# Patient Record
Sex: FEMALE | NOT HISPANIC OR LATINO | Employment: OTHER | URBAN - METROPOLITAN AREA
[De-identification: names, ages, dates, MRNs, and addresses within clinical notes are randomized per-mention and may not be internally consistent; named-entity substitution may affect disease eponyms.]

---

## 2018-08-23 ENCOUNTER — TELEPHONE (OUTPATIENT)
Dept: ENDOSCOPY | Facility: HOSPITAL | Age: 53
End: 2018-08-23

## 2018-08-23 ENCOUNTER — OFFICE VISIT (OUTPATIENT)
Dept: GASTROENTEROLOGY | Facility: CLINIC | Age: 53
End: 2018-08-23
Payer: MEDICARE

## 2018-08-23 VITALS
HEART RATE: 76 BPM | SYSTOLIC BLOOD PRESSURE: 133 MMHG | DIASTOLIC BLOOD PRESSURE: 83 MMHG | WEIGHT: 204 LBS | HEIGHT: 67 IN | BODY MASS INDEX: 32.02 KG/M2

## 2018-08-23 DIAGNOSIS — R68.81 EARLY SATIETY: ICD-10-CM

## 2018-08-23 DIAGNOSIS — K92.1 MELENA: ICD-10-CM

## 2018-08-23 DIAGNOSIS — R14.0 ABDOMINAL BLOATING: ICD-10-CM

## 2018-08-23 DIAGNOSIS — I25.10 CORONARY ARTERY DISEASE, ANGINA PRESENCE UNSPECIFIED, UNSPECIFIED VESSEL OR LESION TYPE, UNSPECIFIED WHETHER NATIVE OR TRANSPLANTED HEART: ICD-10-CM

## 2018-08-23 DIAGNOSIS — R10.30 LOWER ABDOMINAL PAIN: ICD-10-CM

## 2018-08-23 DIAGNOSIS — I10 HYPERTENSION, UNSPECIFIED TYPE: ICD-10-CM

## 2018-08-23 DIAGNOSIS — J45.909 ASTHMA, UNSPECIFIED ASTHMA SEVERITY, UNSPECIFIED WHETHER COMPLICATED, UNSPECIFIED WHETHER PERSISTENT: ICD-10-CM

## 2018-08-23 DIAGNOSIS — Z79.01 ENCOUNTER FOR CURRENT LONG-TERM USE OF ANTICOAGULANTS: ICD-10-CM

## 2018-08-23 DIAGNOSIS — R13.19 ESOPHAGEAL DYSPHAGIA: ICD-10-CM

## 2018-08-23 DIAGNOSIS — K52.9 CHRONIC DIARRHEA: Primary | ICD-10-CM

## 2018-08-23 DIAGNOSIS — I50.9 CONGESTIVE HEART FAILURE, UNSPECIFIED HF CHRONICITY, UNSPECIFIED HEART FAILURE TYPE: ICD-10-CM

## 2018-08-23 DIAGNOSIS — R10.13 EPIGASTRIC PAIN: ICD-10-CM

## 2018-08-23 DIAGNOSIS — R11.2 NON-INTRACTABLE VOMITING WITH NAUSEA, UNSPECIFIED VOMITING TYPE: ICD-10-CM

## 2018-08-23 PROCEDURE — 99205 OFFICE O/P NEW HI 60 MIN: CPT | Mod: S$PBB,,, | Performed by: PHYSICIAN ASSISTANT

## 2018-08-23 PROCEDURE — 99999 PR PBB SHADOW E&M-EST. PATIENT-LVL III: CPT | Mod: PBBFAC,,, | Performed by: PHYSICIAN ASSISTANT

## 2018-08-23 PROCEDURE — 99213 OFFICE O/P EST LOW 20 MIN: CPT | Mod: PBBFAC | Performed by: PHYSICIAN ASSISTANT

## 2018-08-23 RX ORDER — CALCIUM ACETATE 667 MG/1
CAPSULE ORAL
COMMUNITY
Start: 2018-08-07

## 2018-08-23 RX ORDER — SEVELAMER CARBONATE 800 MG/1
1600 TABLET, FILM COATED ORAL
COMMUNITY

## 2018-08-23 RX ORDER — CITALOPRAM 40 MG/1
40 TABLET, FILM COATED ORAL
COMMUNITY
Start: 2017-11-27

## 2018-08-23 RX ORDER — HYDROCODONE BITARTRATE AND ACETAMINOPHEN 10; 325 MG/1; MG/1
TABLET ORAL
COMMUNITY
Start: 2018-06-30

## 2018-08-23 RX ORDER — FLUTICASONE PROPIONATE 50 MCG
1 SPRAY, SUSPENSION (ML) NASAL
COMMUNITY

## 2018-08-23 RX ORDER — FLUTICASONE PROPIONATE 50 MCG
SPRAY, SUSPENSION (ML) NASAL
COMMUNITY
Start: 2018-07-24

## 2018-08-23 RX ORDER — GABAPENTIN 800 MG/1
800 TABLET ORAL
COMMUNITY

## 2018-08-23 RX ORDER — CYCLOBENZAPRINE HCL 10 MG
TABLET ORAL
COMMUNITY
Start: 2018-07-10

## 2018-08-23 RX ORDER — FAMOTIDINE 20 MG/1
20 TABLET, FILM COATED ORAL
COMMUNITY

## 2018-08-23 RX ORDER — ASPIRIN 325 MG
325 TABLET ORAL
COMMUNITY

## 2018-08-23 RX ORDER — BUDESONIDE 0.5 MG/2ML
0.5 INHALANT ORAL
COMMUNITY
Start: 2018-07-11 | End: 2019-07-11

## 2018-08-23 RX ORDER — ARFORMOTEROL TARTRATE 15 UG/2ML
15 SOLUTION RESPIRATORY (INHALATION)
COMMUNITY
Start: 2018-06-14

## 2018-08-23 RX ORDER — SULFAMETHOXAZOLE AND TRIMETHOPRIM 800; 160 MG/1; MG/1
TABLET ORAL
COMMUNITY
Start: 2018-06-11

## 2018-08-23 RX ORDER — ALBUTEROL SULFATE 90 UG/1
2 AEROSOL, METERED RESPIRATORY (INHALATION)
COMMUNITY
Start: 2017-08-22

## 2018-08-23 RX ORDER — FENOFIBRATE 48 MG/1
48 TABLET, FILM COATED ORAL
COMMUNITY

## 2018-08-23 RX ORDER — CITALOPRAM 40 MG/1
TABLET, FILM COATED ORAL
COMMUNITY
Start: 2018-07-10

## 2018-08-23 RX ORDER — FLUTICASONE PROPIONATE 50 MCG
2 SPRAY, SUSPENSION (ML) NASAL
COMMUNITY
Start: 2018-07-20 | End: 2019-07-20

## 2018-08-23 RX ORDER — CALCIUM ACETATE 667 MG/1
CAPSULE ORAL
COMMUNITY
Start: 2016-11-07

## 2018-08-23 RX ORDER — CARVEDILOL 3.12 MG/1
3.12 TABLET ORAL
COMMUNITY

## 2018-08-23 RX ORDER — CYCLOBENZAPRINE HCL 5 MG
5 TABLET ORAL
COMMUNITY

## 2018-08-23 RX ORDER — PANTOPRAZOLE SODIUM 40 MG/1
40 TABLET, DELAYED RELEASE ORAL
COMMUNITY
Start: 2016-11-07

## 2018-08-23 RX ORDER — DOCUSATE SODIUM 100 MG/1
100 CAPSULE, LIQUID FILLED ORAL
COMMUNITY

## 2018-08-23 RX ORDER — PANTOPRAZOLE SODIUM 40 MG/1
TABLET, DELAYED RELEASE ORAL
COMMUNITY
Start: 2018-08-13

## 2018-08-23 RX ORDER — CLOPIDOGREL BISULFATE 75 MG/1
75 TABLET ORAL
COMMUNITY
Start: 2016-06-27

## 2018-08-23 RX ORDER — ONDANSETRON 8 MG/1
8 TABLET, ORALLY DISINTEGRATING ORAL
COMMUNITY
Start: 2018-02-16

## 2018-08-23 RX ORDER — CINACALCET 90 MG/1
90 TABLET, FILM COATED ORAL
COMMUNITY

## 2018-08-23 RX ORDER — GABAPENTIN 800 MG/1
TABLET ORAL
COMMUNITY
Start: 2018-08-07

## 2018-08-23 RX ORDER — HYDROCODONE BITARTRATE AND ACETAMINOPHEN 10; 325 MG/1; MG/1
1 TABLET ORAL
COMMUNITY
Start: 2016-11-10

## 2018-08-23 RX ORDER — MONTELUKAST SODIUM 10 MG/1
10 TABLET ORAL
COMMUNITY
Start: 2018-06-14 | End: 2019-06-14

## 2018-08-23 RX ORDER — ALBUTEROL SULFATE 1.25 MG/3ML
1 SOLUTION RESPIRATORY (INHALATION)
COMMUNITY

## 2018-08-23 RX ORDER — MIDODRINE HYDROCHLORIDE 10 MG/1
TABLET ORAL
COMMUNITY
Start: 2018-07-24

## 2018-08-23 RX ORDER — HYDROXYZINE HYDROCHLORIDE 25 MG/1
25 TABLET, FILM COATED ORAL
COMMUNITY
Start: 2017-10-17

## 2018-08-23 RX ORDER — CLOPIDOGREL BISULFATE 75 MG/1
TABLET ORAL
COMMUNITY
Start: 2018-06-29

## 2018-08-23 RX ORDER — LANTHANUM CARBONATE 500 MG/1
1000 TABLET, CHEWABLE ORAL
COMMUNITY

## 2018-08-23 NOTE — PATIENT INSTRUCTIONS
Start a fiber supplement. Start once daily and increase on a weekly basis    You must stop all narcotic 48hrs prior to the gastric emptying study    Have y ou stool study result faxed to the office

## 2018-08-23 NOTE — LETTER
August 23, 2018      Juan José Weller MD  [address]  [place] [address]           [facility]  [address]  [facility] [address]  Phone: [phone]  Fax: [phone]          Patient: Leahta Adhikari   MR Number: [id number]   YOB: 1965   Date of Visit: 8/23/2018       Dear Dr. Juan José Weller:    Thank you for referring Leatha Adhikari to me for evaluation. Attached you will find relevant portions of my assessment and plan of care.    If you have questions, please do not hesitate to call me. I look forward to following Leatha Adhikari along with you.    Sincerely,    Amelie William PA-C    Enclosure  CC:  No Recipients    If you would like to receive this communication electronically, please contact [email or web address] or [phone] to request more information on [facility] [employer] access.    For providers and/or their staff who would like to refer a patient to [facility], please contact us through our one-stop-shop provider referral line, [facility], at [phone].    If you feel you have received this communication in error or would no longer like to receive these types of communications, please e-mail [email or web address]

## 2018-08-23 NOTE — PROGRESS NOTES
[facility] Consultation Note    Reason for Consult:  The primary encounter diagnosis was Chronic diarrhea. Diagnoses of Non-intractable vomiting with nausea, unspecified vomiting type, Abdominal bloating, Lower abdominal pain, Early satiety, Melena, Esophageal dysphagia, Epigastric pain, Encounter for current long-term use of anticoagulants, Coronary artery disease, angina presence unspecified, unspecified vessel or lesion type, unspecified whether native or transplanted heart, Hypertension, unspecified type, Asthma, unspecified asthma severity, unspecified whether complicated, unspecified whether persistent, and Congestive heart failure, unspecified HF chronicity, unspecified heart failure type were also pertinent to this visit.    PCP:   Primary Doctor No   [address] / [place] 3*    Referring MD:  Juan José Weller Md  [address] [place] [address]    HPI:  This is a 53 y.o. female referred by Dr Weller for evaluation of diarrhea, abdominal pain, N/V.  She took a bus that was provided through her health insurance to travel 1.5hrs for her visit today,    Has chronic diarrhea  X 1 yr  Typically has a single BM daily with taking questran 4g  Three times a week she will have severe diarrhea  Associated lower abominal sharp pain  Pain is contant., always 10/10  +Nocturnal stool 3 weeks ago  Sweats and weakness with diarrhea episodes  Denies rectal bleeding  S/p carol    Food gets stuck in the epigastric region and she will regurgitate, then voming  Vomiting 2-3 times a week  Admits to seeing melena a couple times a week  Taking ASA 325mg and plavix    Getting iron via dialysis    PMH of PVD s/ B/L lower extremity and digit ambutations    [place] records in care everywhere were reviewed  GI MD notes: Last seen by me in late 2017 w multiple c/o. LA cramping discomfort w assoc diarrhea. PP epigastric burning discomfort,  N/V, bloating. Labwork in office revealed elevated inflammatory markers and pos HBT. Tx'ed for SIBO, but feels sx fairly unchanged. On daily PPI currently.     3/2018 EGD/ screening colon  HP neg. chronic gastritis, focal mild active gastritis (neutrophils), and focal intestinal metaplasia.   Bx for microcolitis - Six fragments of colonic mucosa with no diagnostic histologic abnormalities. The crypt architecture is well maintained, and there are no crypt abscesses or granulomas. Rare, nonspecific, small intramucosal lymphoid aggregates are noted.     2/2018 CT scan - extensive vascular calcification noted mid smaller vessels throughout the abdomen and pelvis in a pattern consistent with calcium phosphate metabolism abnormality diabetes renal failure. S/p carol. Otherwise normal GI tract    5/2014 GES normal    ROS:  Constitutional: No fevers, chills, No weight loss  ENT: No allergies  CV: No chest pain  Pulm: No cough, No shortness of breath  Ophtho: No vision changes  GI: see HPI  Derm: No rash  Heme: No lymphadenopathy, No bruising  MSK: No arthritis  : No dysuria, No hematuria  Endo: No hot or cold intolerance  Neuro: No syncope, No seizure  Psych: No anxiety, No depression    Medical History:  has a past medical history of CHF (congestive heart failure), Chronic diarrhea, Dialysis patient, DM (diabetes mellitus), GERD (gastroesophageal reflux disease), Kidney disease, and Stroke (09/2009).    Surgical History:  has a past surgical history that includes Amputation (Bilateral, 2012 and 2015); Coronary angioplasty with stent (2001); Cholecystectomy; and Hysterectomy.    Family History: family history is not on file..     Social History:  reports that  has never smoked. She does not have any smokeless tobacco history on file.    Review of patient's allergies indicates:   Allergen Reactions    Ace inhibitors     Percocet [oxycodone-acetaminophen]        Current Outpatient Medications on File Prior to Visit    Medication Sig Dispense Refill    albuterol 90 mcg/actuation inhaler Inhale 2 puffs into the lungs.      arformoterol (BROVANA) 15 mcg/2 mL Nebu 15 mcg.      aspirin-calcium carbonate 81 mg-300 mg calcium(777 mg) Tab Take 81 mg by mouth.      budesonide (PULMICORT) 0.5 mg/2 mL nebulizer solution 0.5 mg.      calcium acetate (PHOSLO) 667 mg capsule Take by mouth.      citalopram (CELEXA) 40 MG tablet Take 40 mg by mouth.      clopidogrel (PLAVIX) 75 mg tablet Take 75 mg by mouth.      fluticasone (FLONASE) 50 mcg/actuation nasal spray 2 sprays by Nasal route.      HYDROcodone-acetaminophen (NORCO)  mg per tablet Take 1 tablet by mouth.      hydrOXYzine HCl (ATARAX) 25 MG tablet Take 25 mg by mouth.      montelukast (SINGULAIR) 10 mg tablet Take 10 mg by mouth.      ondansetron (ZOFRAN-ODT) 8 MG TbDL Take 8 mg by mouth.      pantoprazole (PROTONIX) 40 MG tablet Take 40 mg by mouth.      rifAXIMin (XIFAXAN) 550 mg Tab Take 550 mg by mouth.      albuterol (ACCUNEB) 1.25 mg/3 mL Nebu 1 ampule.      aspirin 325 MG tablet Take 325 mg by mouth.      calcium acetate (PHOSLO) 667 mg capsule       carvedilol (COREG) 3.125 MG tablet Take 3.125 mg by mouth.      cinacalcet (SENSIPAR) 90 MG Tab Take 90 mg by mouth.      citalopram (CELEXA) 40 MG tablet       clopidogrel (PLAVIX) 75 mg tablet       cyclobenzaprine (FLEXERIL) 10 MG tablet       cyclobenzaprine (FLEXERIL) 5 MG tablet Take 5 mg by mouth.      docusate sodium (COLACE) 100 MG capsule Take 100 mg by mouth.      famotidine (PEPCID) 20 MG tablet Take 20 mg by mouth.      fenofibrate (TRICOR) 48 MG tablet Take 48 mg by mouth.      fluticasone (FLONASE) 50 mcg/actuation nasal spray       fluticasone (FLONASE) 50 mcg/actuation nasal spray 1 spray by Nasal route.      gabapentin (NEURONTIN) 800 MG tablet Take 800 mg by mouth.      gabapentin (NEURONTIN) 800 MG tablet       HYDROcodone-acetaminophen (NORCO)  mg per tablet        "lanthanum (FOSRENOL) 500 MG Chew Take 1,000 mg by mouth.      midodrine (PROAMATINE) 10 MG tablet       pantoprazole (PROTONIX) 40 MG tablet       sevelamer carbonate (RENVELA) 800 mg Tab Take 1,600 mg by mouth.      sulfamethoxazole-trimethoprim 800-160mg (BACTRIM DS) 800-160 mg Tab        No current facility-administered medications on file prior to visit.          Objective Findings:    Vital Signs:  /83   Pulse 76   Ht 5' 7" (1.702 m)   Wt 92.5 kg (204 lb)   BMI 31.95 kg/m²   Body mass index is 31.95 kg/m².    Physical Exam:  General Appearance: Well appearing in no acute distress  Head:   Normocephalic, without obvious abnormality  Eyes:    No scleral icterus  ENT: Neck supple, Lips, mucosa, and tongue normal  Lungs: CTA bilaterally in anterior and posterior fields, no wheezes, no crackles.  Heart:  Regular rate and rhythm, S1, S2 normal, no murmurs heard  Abdomen: Soft, non tender, non distended with positive bowel sounds in all four quadrants. No hepatosplenomegaly, ascites, or mass  Extremities: 2+ pulses, no clubbing, cyanosis or edema  Skin: No rash  Neurologic: AAO x 3      Labs:  No results found for: WBC, HGB, HCT, PLT, CHOL, TRIG, HDL, LDLDIRECT, ALT, AST, NA, K, CL, CREATININE, BUN, CO2, TSH, PSA, INR, GLUF, HGBA1C, MICROALBUR    Imaging:    Endoscopy:    3/2018 EGD/colon - see above    2014 EGD -  A hiatus hernia was found in the esophagus (553.3). Esophagus was o/w normal w/out strictures. Food was found in the antrum, body of the stomach, and fundus. Very poor visialization of mucosa. Normal duodenal bulb.    Colonososcopy  Findings: The mucosa of the colon appeared to be normal. Four cold forceps biopsies was taken from the sigmoid colon. The specimens were collected for microscopic colitis      Assessment:  1. Chronic diarrhea    2. Non-intractable vomiting with nausea, unspecified vomiting type    3. Abdominal bloating    4. Lower abdominal pain    5. Early satiety    6. Melena  "   7. Esophageal dysphagia    8. Epigastric pain    9. Encounter for current long-term use of anticoagulants    10. Coronary artery disease, angina presence unspecified, unspecified vessel or lesion type, unspecified whether native or transplanted heart    11. Hypertension, unspecified type    12. Asthma, unspecified asthma severity, unspecified whether complicated, unspecified whether persistent    13. Congestive heart failure, unspecified HF chronicity, unspecified heart failure type       54yo F here for a second opinion regarding her chronic diarrhea and lower abdominal pain. He also has esophageal dysphagia with associated regurgitation and vomiting  EGD/ colon in 3/2018 were unrevealing. CT scan was unrevealing except for extensive vascular calcification. She is at risk for mesenteric ischemia    Recommendations:  1. Schedule EGD to rule out esophageal stricture and ulcers. She is high risk for endoscopy with CAD on plavix  2. Schedule esophageal manometry to rule out esophageal dysmotility  3. GES to rule out gastroparesis. Off narcotics x  48hrs  4. Mesenteric ischemia US  5. Stool studies to rule out malabsorption  6 continue questran. Start a fiber supplement    Follow-up in about 1 month (around 9/23/2018). with MD only for 2nd opinion      Order summary:  Orders Placed This Encounter    US Mesenteric Ischemia Study (xpd)    NM Gastric Emptying    Pancreatic elastase, fecal    Fecal fat, qualitative    Case request GI: EGD (ESOPHAGOGASTRODUODENOSCOPY), MANOMETRY, ESOPHAGEAL, WITH IMPEDANCE MEASUREMENT         Thank you so much for allowing me to participate in the care of Leatha Adhikari    Amelie William PA-C

## 2018-08-23 NOTE — TELEPHONE ENCOUNTER
Patient in office to schedule EGD and manometry.  While reviewing PMH and medications, it was noted that patient she is on Plavix.   She is unsure who is monitoring the Plavix. She thinks it is from her dialysis doctor.  Informed patient that we need approval to hold Plavix from physician prior to scheduling procedure.   After receiving information back, I will call her to schedule procedure.  Stated understanding.   No further questions at this time.

## 2018-08-29 ENCOUNTER — TELEPHONE (OUTPATIENT)
Dept: GASTROENTEROLOGY | Facility: CLINIC | Age: 53
End: 2018-08-29

## 2018-08-29 NOTE — TELEPHONE ENCOUNTER
Message left for patient to return my call regarding ultrasound and gastric emptying study.  See appointment schedule.

## 2018-09-10 ENCOUNTER — TELEPHONE (OUTPATIENT)
Dept: ENDOSCOPY | Facility: HOSPITAL | Age: 53
End: 2018-09-10

## 2018-09-14 ENCOUNTER — TELEPHONE (OUTPATIENT)
Dept: ENDOSCOPY | Facility: HOSPITAL | Age: 53
End: 2018-09-14

## 2018-09-14 NOTE — TELEPHONE ENCOUNTER
Contacted patient for update.  She informed me that she missed her Cardiology appointment due to transportation issues.  Explained to her that her doctor recommended she been seen prior to giving approval to hold Plavix.  She stated she would make another appointment.  Direct line phone number provided to return call after that visit.  Stated understanding.

## 2018-10-02 ENCOUNTER — TELEPHONE (OUTPATIENT)
Dept: ENDOSCOPY | Facility: HOSPITAL | Age: 53
End: 2018-10-02

## 2018-10-10 ENCOUNTER — TELEPHONE (OUTPATIENT)
Dept: ENDOSCOPY | Facility: HOSPITAL | Age: 53
End: 2018-10-10

## 2018-10-10 NOTE — TELEPHONE ENCOUNTER
Attempted contact to follow up on scheduling EGD.   No answer-left direct line phone number to return call.     Patient approval to hold Plavix and last Cardiology record scanned into chart under media tab.